# Patient Record
Sex: MALE | Race: WHITE | NOT HISPANIC OR LATINO | Employment: OTHER | URBAN - METROPOLITAN AREA
[De-identification: names, ages, dates, MRNs, and addresses within clinical notes are randomized per-mention and may not be internally consistent; named-entity substitution may affect disease eponyms.]

---

## 2017-01-03 DIAGNOSIS — E03.9 HYPOTHYROIDISM: ICD-10-CM

## 2017-01-03 RX ORDER — LEVOTHYROXINE SODIUM 0.03 MG/1
TABLET ORAL
Qty: 30 TABLET | Refills: 0 | Status: SHIPPED | OUTPATIENT
Start: 2017-01-03

## 2017-01-30 RX ORDER — LISINOPRIL 40 MG/1
TABLET ORAL
Qty: 30 TABLET | Refills: 0 | Status: SHIPPED | OUTPATIENT
Start: 2017-01-30

## 2017-03-09 ENCOUNTER — TRANSCRIBE ORDERS (OUTPATIENT)
Dept: PHYSICAL THERAPY | Facility: HOSPITAL | Age: 82
End: 2017-03-09

## 2017-03-09 DIAGNOSIS — M54.31 RIGHT SIDED SCIATICA: Primary | ICD-10-CM

## 2017-03-21 ENCOUNTER — HOSPITAL ENCOUNTER (OUTPATIENT)
Dept: PHYSICAL THERAPY | Facility: HOSPITAL | Age: 82
Setting detail: THERAPIES SERIES
Discharge: HOME | End: 2017-03-21

## 2017-03-21 DIAGNOSIS — M54.31 SCIATICA OF RIGHT SIDE: Primary | ICD-10-CM

## 2017-03-21 PROCEDURE — G8981 BODY POS CURRENT STATUS: HCPCS | Performed by: PHYSICAL THERAPIST

## 2017-03-21 PROCEDURE — 97162 PT EVAL MOD COMPLEX 30 MIN: CPT | Performed by: PHYSICAL THERAPIST

## 2017-03-21 PROCEDURE — G8982 BODY POS GOAL STATUS: HCPCS | Performed by: PHYSICAL THERAPIST

## 2017-03-21 PROCEDURE — 97110 THERAPEUTIC EXERCISES: CPT | Performed by: PHYSICAL THERAPIST

## 2017-03-21 NOTE — PROGRESS NOTES
Outpatient Physical Therapy Ortho Initial Evaluation  [facility]     Patient Name: Maria De Jesus Valente  : 1932  MRN: [id number]  Today's Date: 3/21/2017      Visit Date: 2017    Patient Active Problem List   Diagnosis   • Migraine   • Arthritis   • Essential hypertension   • Anxiety   • Hypothyroidism   • CKD (chronic kidney disease)   • Hyperkalemia   • HLD (hyperlipidemia)   • Mild intermittent asthma without complication   • Acquired elevated hemidiaphragm   • Depression   • Irritable bowel syndrome with diarrhea   • Left inguinal hernia        Past Medical History   Diagnosis Date   • Arthritis    • Cancer 2003     prostate removed   • Diverticulosis    • Elevated diaphragm    • Headache    • Hypertension    • Macular degeneration    • Sciatica         Past Surgical History   Procedure Laterality Date   • Colonoscopy     • Hernia repair  10/17/2014   • Rotator cuff repair         Visit Dx:     ICD-10-CM ICD-9-CM   1. Sciatica of right side M54.31 724.3                 PT Ortho       17 1000    Subjective Comments    Subjective Comments (R) sciatica 4 weeks onset/ 6/ 10 pain with sitting  -    Subjective Pain    Able to rate subjective pain? yes  -    Pre-Treatment Pain Level 6  -    Sensation    Sensation WNL? WNL  -    Special Tests/Palpation    Special Tests/Palpation --   (R) piriformis  -    ROM (Range of Motion)    General ROM Detail --   decreasd (R) hip flexon 90/ ER 50%; lumbar flexion 25%  -    MMT (Manual Muscle Testing)    General MMT Assessment Detail --   core lumbar stabilization 3+/5; glute medius 3+/5 (B)  -    Transfers    Transfer, Comment pain with sit-stand / pain with sitting > 20 mins  -    Gait Assessment/Treatment    Gait, Comment 15 min ambulation tolerance secondary to (R) knee pain  -    Stairs Assessment/Treatment    Stairs, Comment unable  -      User Key  (r) = Recorded By, (t) = Taken By, (c) = Cosigned By    Initials Name Provider  Type     Carlos Rodriguez, PT Physical Therapist                                PT OP Goals       03/21/17 1400       PT Short Term Goals    STG 1 Pt to be independent with initial HEP. 2 wks  -     STG 2 Pt to exhibit increased tolerance with prolonged sitting >25 mins. 2 wks.   -     STG 3 decreased (R) piriformis ttp from severe to minimal to allow for increased ease with prolonged sitting/ all transfers. 2 wks.   -     STG 4 (-) slump/ ASLR (R) 2 wks.   -     Long Term Goals    LTG 1 increased timed sit-stand x 5 test from 24 s to 20 s . 4 wks.   -     LTG 2 increased core trunk / lumbar stabilization MMT 4/5 to allow for increased ease with all house hold tasks. 4 wks.   -     LTG 3 increased (R) hip AROM = (L) to allow for improved symmetry for all functional actiivities. 4 wks.   -     LTG 4 Pt to report to be > 75 % functionally improved. 4 wks.   -       User Key  (r) = Recorded By, (t) = Taken By, (c) = Cosigned By    Initials Name Provider Type     Carlos Rodriguez, PT Physical Therapist                PT Assessment/Plan       03/21/17 1412       PT Assessment    Assessment Comments Pt is a 84 y/o male presenting to PT with s/s consistent with (R) sciatica; (+) ASLR/ (+) slump on (R); severe ttp (R) piriformis; pain with sitting > 15 mins;  decreased core trunk lumbar stabilization 3+/5;  24 s timed sit-stand x 5 tests; all dermatomes/ myotomes are intact; absent L4-S1 reflex;  Pt is a good candidate for skilled PT service prognosis for this patient is good.   -     Please refer to paper survey for additional self-reported information Yes  -     Rehab Potential Good  -     Patient/caregiver participated in establishment of treatment plan and goals Yes  -     Patient would benefit from skilled therapy intervention Yes  -     PT Plan    PT Frequency 2x/week  -     Planned CPT's? PT EVAL MOD COMPLELITY: 46590;PT THER PROC EA 15 MIN: 66898;PT GAIT TRAINING EA 15 MIN: 65726;PT  NEUROMUSC RE-EDUCATION EA 15 MIN: 40879;PT MANUAL THERAPY EA 15 MIN: 74151;PT AQUATIC THERAPY EA 15 MIN: 94333;PT HOT OR COLD PACK TREAT MCARE;PT ELECTRICAL STIM UNATTEND: ;PT ULTRASOUND EA 15 MIN: 00502  -       User Key  (r) = Recorded By, (t) = Taken By, (c) = Cosigned By    Initials Name Provider Type     Carlos Rodriguez PT Physical Therapist                  Exercises       03/21/17 1000          Subjective Comments    Subjective Comments (R) sciatica 4 weeks onset/ 6/ 10 pain with sitting  -      Subjective Pain    Able to rate subjective pain? yes  -      Pre-Treatment Pain Level 6  -      Exercise 1    Exercise Name 1 see flow sheet  -        User Key  (r) = Recorded By, (t) = Taken By, (c) = Cosigned By    Initials Name Provider Type     Carlos Rodriguez PT Physical Therapist                              Outcome Measures       03/21/17 1400          5 Times Sit to Stand    5 Times Sit to Stand (seconds) --   24  -      Functional Assessment    Outcome Measure Options 5x Sit to Stand  -        User Key  (r) = Recorded By, (t) = Taken By, (c) = Cosigned By    Initials Name Provider Type     Carlos Rodriguez PT Physical Therapist            Time Calculation:   Start Time: 0930  Stop Time: 1015  Time Calculation (min): 45 min     Therapy Charges for Today     Code Description Service Date Service Provider Modifiers Qty    46478128461  PT [facility] MAIN POS CURRENT 3/21/2017 Carlos Rodriguez, PT GP, CK 1    [id number]  PT [facility] MAIN POS PROJECTED 3/21/2017 Carlos Rodriguez, PT GP, CJ 1    [id number]  PT THER PROC EA 15 MIN 3/21/2017 Carlos Rodriguez, PT GP 1    [id number]  PT EVAL MOD COMPLEXITY 2 3/21/2017 Carlos Rodriguez, PT GP 1          PT G-Codes  Outcome Measure Options: 5x Sit to Stand  Score: 24  Functional Limitation: Changing and maintaining body position  Changing and Maintaining Body Position Current Status (): At least 40 percent but less than 60 percent impaired,  limited or restricted  Changing and Maintaining Body Position Goal Status (): At least 20 percent but less than 40 percent impaired, limited or restricted         Carlos Rodriguez, PT  3/21/2017

## 2017-03-27 ENCOUNTER — APPOINTMENT (OUTPATIENT)
Dept: PHYSICAL THERAPY | Facility: HOSPITAL | Age: 82
End: 2017-03-27

## 2017-03-29 ENCOUNTER — APPOINTMENT (OUTPATIENT)
Dept: PHYSICAL THERAPY | Facility: HOSPITAL | Age: 82
End: 2017-03-29

## 2017-04-03 ENCOUNTER — APPOINTMENT (OUTPATIENT)
Dept: PHYSICAL THERAPY | Facility: HOSPITAL | Age: 82
End: 2017-04-03

## 2017-04-05 ENCOUNTER — APPOINTMENT (OUTPATIENT)
Dept: PHYSICAL THERAPY | Facility: HOSPITAL | Age: 82
End: 2017-04-05

## 2017-04-10 ENCOUNTER — APPOINTMENT (OUTPATIENT)
Dept: PHYSICAL THERAPY | Facility: HOSPITAL | Age: 82
End: 2017-04-10

## 2017-04-13 ENCOUNTER — APPOINTMENT (OUTPATIENT)
Dept: PHYSICAL THERAPY | Facility: HOSPITAL | Age: 82
End: 2017-04-13

## 2017-04-17 ENCOUNTER — APPOINTMENT (OUTPATIENT)
Dept: PHYSICAL THERAPY | Facility: HOSPITAL | Age: 82
End: 2017-04-17

## 2017-04-19 ENCOUNTER — APPOINTMENT (OUTPATIENT)
Dept: PHYSICAL THERAPY | Facility: HOSPITAL | Age: 82
End: 2017-04-19

## 2017-04-21 ENCOUNTER — HOSPITAL ENCOUNTER (OUTPATIENT)
Dept: PHYSICAL THERAPY | Facility: HOSPITAL | Age: 82
Setting detail: THERAPIES SERIES
Discharge: HOME | End: 2017-04-21

## 2017-04-21 DIAGNOSIS — M54.31 SCIATICA OF RIGHT SIDE: Primary | ICD-10-CM

## 2017-04-21 DIAGNOSIS — G57.01 PIRIFORMIS SYNDROME, RIGHT: ICD-10-CM

## 2017-04-21 PROCEDURE — 97110 THERAPEUTIC EXERCISES: CPT | Performed by: PHYSICAL THERAPIST

## 2017-04-21 NOTE — PROGRESS NOTES
Outpatient Physical Therapy Ortho Re-Assessment  [facility]     Patient Name: Maria De Jesus Valente  : 1932  MRN: [id number]  Today's Date: 2017      Visit Date: 2017    Patient Active Problem List   Diagnosis   • Migraine   • Arthritis   • Essential hypertension   • Anxiety   • Hypothyroidism   • CKD (chronic kidney disease)   • Hyperkalemia   • HLD (hyperlipidemia)   • Mild intermittent asthma without complication   • Acquired elevated hemidiaphragm   • Depression   • Irritable bowel syndrome with diarrhea   • Left inguinal hernia        Past Medical History:   Diagnosis Date   • Arthritis    • Cancer 2003    prostate removed   • Diverticulosis    • Elevated diaphragm    • Headache    • Hypertension    • Macular degeneration    • Sciatica         Past Surgical History:   Procedure Laterality Date   • COLONOSCOPY     • HERNIA REPAIR  10/17/2014   • ROTATOR CUFF REPAIR         Visit Dx:     ICD-10-CM ICD-9-CM   1. Sciatica of right side M54.31 724.3   2. Piriformis syndrome, right G57.01 355.0                                     PT OP Goals       17 1400       PT Short Term Goals    STG 1 Pt to be independent with initial HEP. 2 wks  -DM     STG 1 Progress Met  -DM     STG 2 Pt to exhibit increased tolerance with prolonged sitting >25 mins. 2 wks.   -DM     STG 2 Progress Met  -DM     STG 2 Progress Comments Can sit without any problem now.  -DM     STG 3 decreased (R) piriformis ttp from severe to minimal to allow for increased ease with prolonged sitting/ all transfers. 2 wks.   -DM     STG 3 Progress Met  -DM     STG 4 (-) slump/ ASLR (R) 2 wks.   -DM     STG 4 Progress Met  -DM     Long Term Goals    LTG 1 increased timed sit-stand x 5 test from 24 s to 20 s . 4 wks.   -DM     LTG 1 Progress Met  -DM     LTG 1 Progress Comments 17.27 seconds  -DM     LTG 2 increased core trunk / lumbar stabilization MMT 4/5 to allow for increased ease with all house hold tasks. 4 wks.   -DM      LTG 2 Progress Ongoing  -DM     LTG 3 increased (R) hip AROM = (L) to allow for improved symmetry for all functional actiivities. 4 wks.   -DM     LTG 3 Progress Ongoing  -DM     LTG 4 Pt to report to be > 75 % functionally improved. 4 wks.   -DM     LTG 4 Progress Progressing  -DM       User Key  (r) = Recorded By, (t) = Taken By, (c) = Cosigned By    Initials Name Provider Type    CHAITANYA Mcdonough, PT Physical Therapist                PT Assessment/Plan       04/21/17 1551       PT Assessment    Assessment Comments david Valente has completed 3 sessions of physical therapy for sciatica and piriformis pain with good results.  He has much less pain and tenderness to palpation.  He is able to sit for extended periods of time without increased.  5 x sit to stand is improved to 17.27 seconds.  His goal to is learn strengthening on equipment for core strengthening in 2-3 sessions.   -DM     Please refer to paper survey for additional self-reported information No  -DM     Rehab Potential Good  -DM     Patient/caregiver participated in establishment of treatment plan and goals Yes  -DM     Patient would benefit from skilled therapy intervention Yes  -DM     PT Plan    PT Frequency 2x/week  -DM     Predicted Duration of Therapy Intervention (days/wks) 1-2 more sessions.   -DM     PT Plan Comments Progress in strengthening on equipment.  -DM       User Key  (r) = Recorded By, (t) = Taken By, (c) = Cosigned By    Initials Name Provider Type    CHAITANYA Mcdonough, PT Physical Therapist                  Exercises       04/21/17 1500          Exercise 1    Exercise Name 1 Instructed in strengthening equipment as outlined on flow sheet.  -DM      Cueing 1 Verbal;Tactile;Demo  -DM      Exercise 2    Exercise Name 2 Reassessment completed.   -DM        User Key  (r) = Recorded By, (t) = Taken By, (c) = Cosigned By    Initials Name Provider Type    CHAITANYA Mcdonough, PT Physical Therapist                              Outcome Measures        04/21/17 1500          5 Times Sit to Stand    5 Times Sit to Stand (seconds) 17.27 seconds  -DM      5 Times Sit to Stand Comments Used arm rest to push up  -DM      Functional Assessment    Outcome Measure Options 5x Sit to Stand  -DM        User Key  (r) = Recorded By, (t) = Taken By, (c) = Cosigned By    Initials Name Provider Type    DM Yesenia Mcdonough, PT Physical Therapist            Time Calculation:   Start Time: 1350  Stop Time: 1430  Time Calculation (min): 40 min     Therapy Charges for Today     Code Description Service Date Service Provider Modifiers Qty    [id number]  PT THER PROC EA 15 MIN 4/21/2017 Yesenia Mcdonough, PT GP 3          PT G-Codes  Outcome Measure Options: 5x Sit to Stand         Yesenia Mcdonough, PT  4/21/2017

## 2017-04-24 ENCOUNTER — APPOINTMENT (OUTPATIENT)
Dept: PHYSICAL THERAPY | Facility: HOSPITAL | Age: 82
End: 2017-04-24

## 2017-04-26 ENCOUNTER — APPOINTMENT (OUTPATIENT)
Dept: PHYSICAL THERAPY | Facility: HOSPITAL | Age: 82
End: 2017-04-26

## 2017-05-05 ENCOUNTER — APPOINTMENT (OUTPATIENT)
Dept: PHYSICAL THERAPY | Facility: HOSPITAL | Age: 82
End: 2017-05-05

## 2017-05-12 ENCOUNTER — APPOINTMENT (OUTPATIENT)
Dept: PHYSICAL THERAPY | Facility: HOSPITAL | Age: 82
End: 2017-05-12

## 2017-07-06 ENCOUNTER — DOCUMENTATION (OUTPATIENT)
Dept: PHYSICAL THERAPY | Facility: HOSPITAL | Age: 82
End: 2017-07-06

## 2017-07-06 NOTE — THERAPY DISCHARGE NOTE
Outpatient Physical Therapy Discharge Summary         Patient Name: Maria De Jesus Valente  : 1932  MRN: [id number]    Today's Date: 2017    Visit Dx:  No diagnosis found.          PT OP Goals       17 1500       PT Short Term Goals    STG 1 Pt to be independent with initial HEP. 2 wks  -DM     STG 1 Progress Met  -DM     STG 2 Pt to exhibit increased tolerance with prolonged sitting >25 mins. 2 wks.   -DM     STG 2 Progress Met  -DM     STG 3 decreased (R) piriformis ttp from severe to minimal to allow for increased ease with prolonged sitting/ all transfers. 2 wks.   -DM     STG 3 Progress Met  -DM     STG 4 (-) slump/ ASLR (R) 2 wks.   -DM     STG 4 Progress Met  -DM     Long Term Goals    LTG 1 increased timed sit-stand x 5 test from 24 s to 20 s . 4 wks.   -DM     LTG 1 Progress Met  -DM     LTG 2 increased core trunk / lumbar stabilization MMT 4/5 to allow for increased ease with all house hold tasks. 4 wks.   -DM     LTG 2 Progress Not Met  -DM     LTG 3 increased (R) hip AROM = (L) to allow for improved symmetry for all functional actiivities. 4 wks.   -DM     LTG 3 Progress Not Met  -DM     LTG 4 Pt to report to be > 75 % functionally improved. 4 wks.   -DM     LTG 4 Progress Not Met  -DM       User Key  (r) = Recorded By, (t) = Taken By, (c) = Cosigned By    Initials Name Provider Type    CHAITANYA Mcdonough, PT Physical Therapist          OP PT Discharge Summary  Date of Discharge: 17  Reason for Discharge: Patient/Caregiver request  Outcomes Achieved: Patient able to partially acheive established goals  Discharge Destination: Home with home program  Discharge Instructions: Maria De Jesus Valente completed 3 sessions of phyiscal therapy for right sciatica.  He had been progressing and improving but did not return for his last 2 sessions of phyiscal therapy.  Phone conversation with him revealed his symptoms were much better and he did not want further PT.  He is discharged at this time.      Time  Calculation:                    Yesenia Mcdonough, PT  7/6/2017